# Patient Record
Sex: FEMALE | Race: WHITE | NOT HISPANIC OR LATINO | Employment: FULL TIME | URBAN - NONMETROPOLITAN AREA
[De-identification: names, ages, dates, MRNs, and addresses within clinical notes are randomized per-mention and may not be internally consistent; named-entity substitution may affect disease eponyms.]

---

## 2023-11-06 ENCOUNTER — OCCUPATIONAL MEDICINE (OUTPATIENT)
Dept: URGENT CARE | Facility: CLINIC | Age: 23
End: 2023-11-06
Payer: COMMERCIAL

## 2023-11-06 VITALS
SYSTOLIC BLOOD PRESSURE: 124 MMHG | OXYGEN SATURATION: 97 % | HEIGHT: 68 IN | BODY MASS INDEX: 35.46 KG/M2 | HEART RATE: 118 BPM | RESPIRATION RATE: 18 BRPM | TEMPERATURE: 97.9 F | DIASTOLIC BLOOD PRESSURE: 82 MMHG | WEIGHT: 234 LBS

## 2023-11-06 DIAGNOSIS — R00.0 TACHYCARDIA: ICD-10-CM
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
## 2023-11-06 PROCEDURE — 99204 OFFICE O/P NEW MOD 45 MIN: CPT | Performed by: FAMILY MEDICINE

## 2023-11-06 PROCEDURE — 3079F DIAST BP 80-89 MM HG: CPT | Performed by: FAMILY MEDICINE

## 2023-11-06 PROCEDURE — 3074F SYST BP LT 130 MM HG: CPT | Performed by: FAMILY MEDICINE

## 2023-11-06 RX ORDER — AMOXICILLIN AND CLAVULANATE POTASSIUM 875; 125 MG/1; MG/1
1 TABLET, FILM COATED ORAL 2 TIMES DAILY
Qty: 6 TABLET | Refills: 0 | Status: SHIPPED | OUTPATIENT
Start: 2023-11-06 | End: 2023-11-09

## 2023-11-06 NOTE — LETTER
[facility]  [address] [place] [address],   [address] [place] [address]  Phone:  [phone] - Fax:  [phone]   [facility] Progress Report and Disability Certification  Date of Service: 11/6/2023   No Show:  No  Date / Time of Next Visit: 11/13/2023   Claim Information   Patient Name: Yoli Ralph  Claim Number:     Employer:   [employer] [[email or web address]] Date of Injury: 11/6/2023     Insurer / TPA: [employer]  ID / SSN:     Occupation:  in Training  Diagnosis: Diagnoses of Cat bite, initial encounter and Tachycardia were pertinent to this visit.    Medical Information   Related to Industrial Injury? Yes    Subjective Complaints:  DOI: 11/6/2023  CASSANDRA: Today at work she was administering subcutaneous fluids when it turned around and bit her left index finger. She now has constant pain to her left index finger, it's described as a dull ache, currently rated 4/10. She has not yet had any medication. She is right hand dominant. Cat's rabies vaccination status is unknown. Patient had rabies vaccines November 2021. Patient's last tdap is 5/24/2022. Denies prior left index finger injury. No secondary employment.    Objective Findings: Puncture wound to the base of her left index finger. She is still able to flex and extend finger against resistance. Equal strength and sensation to hands bilaterally.    Pre-Existing Condition(s):     Assessment:   Initial Visit    Status: Additional Care Required  Permanent Disability:No    Plan:      Diagnostics:      Comments:  -Released to full duty  -Prophylactic Augmentin has been sent  -Tylenol and ibuprofen as needed for pain  -Ice for swelling for the first 48 hours    Disability Information   Status: Released to Full Duty    From:  11/6/2023  Through: 11/13/2023 Restrictions are: Temporary   Physical Restrictions   Sitting:    Standing:    Stooping:    Bending:      Squatting:    Walking:    Climbing:     Pushing:      Pulling:    Other:    Reaching Above Shoulder (L):   Reaching Above Shoulder (R):       Reaching Below Shoulder (L):    Reaching Below Shoulder (R):      Not to exceed Weight Limits   Carrying(hrs):   Weight Limit(lb):   Lifting(hrs):   Weight  Limit(lb):     Comments:      Repetitive Actions   Hands: i.e. Fine Manipulations from Grasping:     Feet: i.e. Operating Foot Controls:     Driving / Operate Machinery:     Health Care Provider’s Original or Electronic Signature  Sherron Lawton M.D. Health Care Provider’s Original or Electronic Signature    Costa Strickland DO MPH     Clinic Name / Location: [address],   [address] [place] [address] Clinic Phone Number: Dept: [phone]   Appointment Time: 4:30 Pm Visit Start Time: 5:04 PM   Check-In Time:  4:42 Pm Visit Discharge Time:  537pm   Original-Treating Physician or Chiropractor    Page 2-Insurer/TPA    Page 3-Employer    Page 4-Employee

## 2023-11-06 NOTE — LETTER
"    EMPLOYEE’S CLAIM FOR COMPENSATION/ REPORT OF INITIAL TREATMENT  FORM C-4  PLEASE TYPE OR PRINT    EMPLOYEE’S CLAIM - PROVIDE ALL INFORMATION REQUESTED   First Name                    MAXIMO Kent Last Name  Ramo Birthdate                    2000                Sex  []M  []F Claim Number (Insurer’s Use Only)     Home Address  [address] Age  23 y.o. 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 (Be specific and answer in detail. Use additional sheet if necessary)  cat bite while administering fluids   If you believe that you have an occupational disease, when did you first have knowledge of the disability and its relationship to your employment?   Witnesses to the Accident (if applicable)  Dr. Abdirahman Cox      Nature of Injury or Occupational Disease  Laceration  Part(s) of Body Injured or Affected  Finger (L) N/A N/A    I CERTIFY THAT THE ABOVE IS TRUE AND CORRECT TO T HE " BEST OF MY KNOWLEDGE AND THAT I HAVE PROVIDED THIS INFORMATION IN ORDER TO OBTAIN THE BENEFITS OF [place], OR CHAPTER 617 [place]).  I HEREBY AUTHORIZE ANY PHYSICIAN, CHIROPRACTOR, SURGEON, PRACTITIONER OR ANY OTHER PERSON, ANY HOSPITAL, INCLUDING [facility] OR [facility], ANY  MEDICAL SERVICE ORGANIZATION, ANY INSURANCE COMPANY, OR OTHER INSTITUTION OR ORGANIZATION TO RELEASE TO EACH OTHER, ANY MEDICAL OR OTHER INFORMATION, INCLUDING BENEFITS PAID OR PAYABLE, PERTINENT TO THIS INJURY OR DISEASE, EXCEPT INFORMATION RELATIVE TO DIAGNOSIS, TREATMENT AND/OR COUNSELING FOR AIDS, PSYCHOLOGICAL CONDITIONS, ALCOHOL OR CONTROLLED SUBSTANCES, FOR WHICH I MUST GIVE SPECIFIC AUTHORIZATION.  A PHOTOSTAT OF THIS AUTHORIZATION SHALL BE VALID AS THE ORIGINAL.     Date   Place Employee’s Original or  *Electronic Signature   THIS REPORT MUST BE COMPLETED AND MAILED WITHIN 3 WORKING DAYS OF TREATMENT   Place  [facility]RGENT [facility]    Name of Facility  [facility]   Date 11/6/2023 Diagnosis and Description of Injury or Occupational Disease  (W55.01XA) Cat bite, initial encounter  (R00.0) Tachycardia  Diagnoses of Cat bite, initial encounter and Tachycardia were pertinent to this visit. Is there evidence that the injured employee was under the influence of alcohol and/or another controlled substance at the time of accident?  []No  [] Yes (if yes, please explain)   Hour 5:04 PM  No   Treatment: -Released to full duty  -Prophylactic Augmentin has been sent  -Tylenol and ibuprofen as needed for pain  -Ice for swelling for the first 48 hours    Have you advised the patient to remain off work five days or more?   [] Yes Indicate dates: From   To    []No If no, is the injured employee capable of: [] full duty [] modified duty                                                             Yes     If modified duty, specify any  limitations / restrictions:                                                                                                                                                                                                                                                                                                                                                                                                                  X-Ray Findings:      From information given by the employee, together with medical evidence, can you directly connect this injury or occupational disease as job incurred?  []Yes   [] No Yes    Is additional medical care by a physician indicated? []Yes [] No  Yes    Do you know of any previous injury or disease contributing to this condition or occupational disease? []Yes [] No (Explain if yes)                          No   Date  11/6/2023 Print Health Care Provider’s Name  Kathy Lawton M.D. I certify that the employer’s copy of  this form was delivered to the employer on:   Address  [address],   [address] INSURER'S USE ONLY                       [place]  [address] Provider’s Tax ID Number  [id number]   Telephone  Dept: [phone]    Health Care Provider’s Original or Electronic Signature  e-KATHY Major M.D. Degree (MD,DO, DC,PA-C,APRN)  MD  Choose (if applicable)      ORIGINAL - TREATING HEALTHCARE PROVIDER PAGE 2 - INSURER/TPA PAGE 3 - EMPLOYER PAGE 4 - EMPLOYEE             Form C-4 (rev.08/23)        BRIEF DESCRIPTION OF RIGHTS AND BENEFITS  (Pursuant to [place] 616C.050)    Notice of Injury or Occupational Disease (Incident Report Form C-1): If an injury or occupational disease (OD) arises out of and in the course of employment, you must provide written notice to your employer as soon as practicable, but no later than 7 days after the accident or OD. Your employer shall maintain a sufficient supply of the required forms.    Claim for  "Compensation (Form C-4): If medical treatment is sought, the form C-4 is available at the place of initial treatment. A completed \"Claim for Compensation\" (Form C-4) must be filed within 90 days after an accident or OD. The treating physician or chiropractor must, within 3 working days after treatment, complete and mail to the employer, the employer's insurer and third-party , the Claim for Compensation.    Medical Treatment: If you require medical treatment for your on-the-job injury or OD, you may be required to select a physician or chiropractor from a list provided by your workers’ compensation insurer, if it has contracted with an Organization for Managed Care (MCO) or Preferred Provider Organization (PPO) or providers of health care. If your employer has not entered into a contract with an MCO or PPO, you may select a physician or chiropractor from the Panel of Physicians and Chiropractors. Any medical costs related to your industrial injury or OD will be paid by your insurer.    Temporary Total Disability (TTD): If your doctor has certified that you are unable to work for a period of at least 5 consecutive days, or 5 cumulative days in a 20-day period, or places restrictions on you that your employer does not accommodate, you may be entitled to TTD compensation.    Temporary Partial Disability (TPD): If the wage you receive upon reemployment is less than the compensation for TTD to which you are entitled, the insurer may be required to pay you TPD compensation to make up the difference. TPD can only be paid for a maximum of 24 months.    Permanent Partial Disability (PPD): When your medical condition is stable and there is an indication of a PPD as a result of your injury or OD, within 30 days, your insurer must arrange for an evaluation by a rating physician or chiropractor to determine the degree of your PPD. The amount of your PPD award depends on the date of injury, the results of the PPD " evaluation, your age and wage.    Permanent Total Disability (PTD): If you are medically certified by a treating physician or chiropractor as permanently and totally disabled and have been granted a PTD status by your insurer, you are entitled to receive monthly benefits not to exceed 66 2/3% of your average monthly wage. The amount of your PTD payments is subject to reduction if you previously received a lump-sum PPD award.    Vocational Rehabilitation Services: You may be eligible for vocational rehabilitation services if you are unable to return to the job due to a permanent physical impairment or permanent restrictions as a result of your injury or occupational disease.    Transportation and Per Nhung Reimbursement: You may be eligible for travel expenses and per nhung associated with medical treatment.    Reopening: You may be able to reopen your claim if your condition worsens after claim closure.     Appeal Process: If you disagree with a written determination issued by the insurer or the insurer does not respond to your request, you may appeal to the Department of Administration, , by following the instructions contained in your determination letter. You must appeal the determination within 70 days from the date of the determination letter at [address], [address], [place], [place] [address], or [address], [address], [place] [address]. If you disagree with the  decision, you may appeal to the Department of Administration, . You must file your appeal within 30 days from the date of the  decision letter at [address], [address], [place], [place] [address], or [address], [address], [place] [address]. If you disagree with a decision of an , you may file a petition for judicial review with the District Court. You must do so within 30 days of the Appeal Officer’s decision. You may be  represented by an  at your own expense or you may contact the [facility] for possible representation.    [place]  for Injured Workers (NAIW): If you disagree with a  decision, you may request that NAIW represent you without charge at an  Hearing. For information regarding denial of benefits, you may contact the [facility] at: [address], [address], [place] [address], [phone], or [address], [address], [place] [address], [phone]    To File a Complaint with the Division: If you wish to file a complaint with the  of the Division of Industrial Relations (DIR),  please contact the Workers’ Compensation Section, [address], [address], [place], [place] [address], telephone [phone], or [address], [address], [place], [place] [address], telephone [phone] [phone].    For assistance with Workers’ Compensation Issues: You may contact the [facility] Office for Consumer Health Assistance, [address], [address], [place], [place] [address], Toll Free 1-645.520.6448, Web site: http://[facility] access hospital.[place].gov/Programs/IHSAN E-mail: [email or web address]              __________________________________________________________________                                    _________________            Employee Name / Signature                                                                                                                            Date                                                                                                                                                                                                                              D-2 (rev. 10/20)

## 2023-11-11 ENCOUNTER — OCCUPATIONAL MEDICINE (OUTPATIENT)
Dept: URGENT CARE | Facility: CLINIC | Age: 23
End: 2023-11-11
Payer: COMMERCIAL

## 2023-11-11 VITALS
HEIGHT: 68 IN | DIASTOLIC BLOOD PRESSURE: 72 MMHG | OXYGEN SATURATION: 96 % | BODY MASS INDEX: 35.46 KG/M2 | HEART RATE: 86 BPM | SYSTOLIC BLOOD PRESSURE: 114 MMHG | RESPIRATION RATE: 16 BRPM | TEMPERATURE: 97.5 F | WEIGHT: 234 LBS
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
## 2023-11-11 PROCEDURE — 3074F SYST BP LT 130 MM HG: CPT | Performed by: PHYSICIAN ASSISTANT

## 2023-11-11 PROCEDURE — 3078F DIAST BP <80 MM HG: CPT | Performed by: PHYSICIAN ASSISTANT

## 2023-11-11 PROCEDURE — 99213 OFFICE O/P EST LOW 20 MIN: CPT | Performed by: PHYSICIAN ASSISTANT

## 2023-11-11 ASSESSMENT — ENCOUNTER SYMPTOMS
SENSORY CHANGE: 0
FEVER: 0
WEAKNESS: 0
NAUSEA: 0
TINGLING: 0
FOCAL WEAKNESS: 0
CHILLS: 0
VOMITING: 0

## 2023-11-11 NOTE — PROGRESS NOTES
"Subjective     Yoli Ralph is a 23 y.o. female who presents with Follow-Up (WC DOI 11/6/23, cat bite on L hand index finger, swelling and redness has gone down )      DOI:11/6/23. Patient here for follow-up on her left index finger cat bite. She finished the 3 days of prophylactic Augmentin. She reports improvement in redness and swelling around the bite. No purulent drainage. No fever or chills. Still some mild pain and tightness with flexion.      HPI      History reviewed. No pertinent past medical history.      History reviewed. No pertinent surgical history.      History reviewed. No pertinent family history.      Allergies:  Lidocaine      Medications, Allergies, and current problem list reviewed today in Epic    Review of Systems   Constitutional:  Negative for chills, fever and malaise/fatigue.   Gastrointestinal:  Negative for nausea and vomiting.   Musculoskeletal:  Negative for joint pain.   Skin:         Cat bite to left index finger   Neurological:  Negative for tingling, sensory change, focal weakness and weakness.              Objective     /72 (BP Location: Left arm, Patient Position: Sitting, BP Cuff Size: Adult)   Pulse 86   Temp 36.4 °C (97.5 °F) (Temporal)   Resp 16   Ht 1.727 m (5' 8\")   Wt 106 kg (234 lb)   SpO2 96%   BMI 35.58 kg/m²      Physical Exam  Constitutional:       General: She is not in acute distress.     Appearance: She is not ill-appearing.   HENT:      Head: Normocephalic and atraumatic.   Eyes:      Conjunctiva/sclera: Conjunctivae normal.   Cardiovascular:      Rate and Rhythm: Normal rate and regular rhythm.   Pulmonary:      Effort: Pulmonary effort is normal. No respiratory distress.      Breath sounds: No stridor. No wheezing.   Neurological:      General: No focal deficit present.      Mental Status: She is alert and oriented to person, place, and time.   Psychiatric:         Mood and Affect: Mood normal.         Behavior: Behavior normal.         Thought " Content: Thought content normal.         Judgment: Judgment normal.         Vitals reviewed.  Left index finger- two small puncture wounds on palmar aspect. No active drainage. Mild surrounding edema and mild erythema. FROM                   Assessment & Plan        1. Cat bite, subsequent encounter    Improving but not 100%  Continue wound care  RTC 4 days.   Full duty    Differential diagnoses, Supportive care, and indications for immediate follow-up discussed with patient.   Pathogenesis of diagnosis discussed including typical length and natural progression.   Instructed to return to clinic or nearest emergency department for any change in condition, further concerns, or worsening of symptoms.      The patient demonstrated a good understanding and agreed with the treatment plan.      Roxie Potter P.A.-C.

## 2023-11-11 NOTE — LETTER
[address],   [address] [place] [address]  Phone:  [phone] - Fax:  [phone]   [facility] Progress Report and Disability Certification  Date of Service: 11/11/2023   No Show:  No  Date / Time of Next Visit: 11/14/2023   Claim Information   Patient Name: Yoli Ralph  Claim Number:     Employer:   [employer] Date of Injury: 11/6/2023     Insurer / TPA: [employer]  ID / SSN:     Occupation:  in Training  Diagnosis: The encounter diagnosis was Cat bite, subsequent encounter.    Medical Information   Related to Industrial Injury? Yes    Subjective Complaints:  DOI:11/6/23. Patient here for follow-up on her left index finger cat bite. She finished the 3 days of prophylactic Augmentin. She reports improvement in redness and swelling around the bite. No purulent drainage. No fever or chills. Still some mild pain and tightness with flexion.    Objective Findings: Vitals reviewed.  Left index finger- two small puncture wounds on palmar aspect. No active drainage. Mild surrounding edema and mild erythema. FROM   Pre-Existing Condition(s):     Assessment:   Condition Improved    Status: Additional Care Required  Permanent Disability:No    Plan:   Comments:keep wound clean covered and dry. RTC 4 days.    Diagnostics:      Comments:       Disability Information   Status: Released to Full Duty    From:  11/11/2023  Through: 11/14/2023 Restrictions are:     Physical Restrictions   Sitting:    Standing:    Stooping:    Bending:      Squatting:    Walking:    Climbing:    Pushing:      Pulling:    Other:    Reaching Above Shoulder (L):   Reaching Above Shoulder (R):       Reaching Below Shoulder (L):    Reaching Below Shoulder (R):      Not to exceed Weight Limits   Carrying(hrs):   Weight Limit(lb):   Lifting(hrs):   Weight  Limit(lb):     Comments:      Repetitive Actions   Hands: i.e. Fine Manipulations  from Grasping:     Feet: i.e. Operating Foot Controls:     Driving / Operate Machinery:     Health Care Provider’s Original or Electronic Signature  Roxie Potter P.A.-C. Health Care Provider’s Original or Electronic Signature    Costa Strickland DO MPH     Clinic Name / Location: [address],   [address]  [place] [address] Clinic Phone Number: Dept: [phone]   Appointment Time: 10:00 Am Visit Start Time: 10:07 AM   Check-In Time:  10:01 Am Visit Discharge Time:  10:18 AM   Original-Treating Physician or Chiropractor    Page 2-Insurer/TPA    Page 3-Employer    Page 4-Employee

## 2023-11-14 ENCOUNTER — OCCUPATIONAL MEDICINE (OUTPATIENT)
Dept: URGENT CARE | Facility: CLINIC | Age: 23
End: 2023-11-14
Payer: COMMERCIAL

## 2023-11-14 VITALS
OXYGEN SATURATION: 97 % | BODY MASS INDEX: 35.52 KG/M2 | TEMPERATURE: 97.9 F | DIASTOLIC BLOOD PRESSURE: 76 MMHG | HEART RATE: 81 BPM | HEIGHT: 68 IN | SYSTOLIC BLOOD PRESSURE: 122 MMHG | RESPIRATION RATE: 18 BRPM | WEIGHT: 234.4 LBS
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
## 2023-11-14 PROCEDURE — 3078F DIAST BP <80 MM HG: CPT | Performed by: NURSE PRACTITIONER

## 2023-11-14 PROCEDURE — 99212 OFFICE O/P EST SF 10 MIN: CPT | Performed by: NURSE PRACTITIONER

## 2023-11-14 PROCEDURE — 3074F SYST BP LT 130 MM HG: CPT | Performed by: NURSE PRACTITIONER

## 2023-11-14 NOTE — LETTER
[address],   [address] [place] [address]  Phone:  [phone] - Fax:  [phone]   [facility] Progress Report and Disability Certification  Date of Service: 11/14/2023   No Show:  No  Date / Time of Next Visit:     Claim Information   Patient Name: Yoli Ralph  Claim Number:     Employer:   [employer] Date of Injury: 11/6/2023     Insurer / TPA: [employer]  ID / SSN:     Occupation:  in Training  Diagnosis: The encounter diagnosis was Cat bite, subsequent encounter.    Medical Information   Related to Industrial Injury? Yes    Subjective Complaints:  Patient presents to clinic for third Workmen's Comp. follow-up.  Patient states that she has no continued redness swelling or pain to the area.  She states she has full range of motion.  She has no concerns at this time and is ready to close the Workmen's Comp. claim.   Objective Findings: Mild scarring to the lateral aspect of the right index finger.  There is no surrounding erythema swelling or tenderness.  Range of motion is intact.  Distal neurovascular status is intact.   Pre-Existing Condition(s):     Assessment:   Condition Improved    Status: Discharged /  MMI  Permanent Disability:No    Plan:      Diagnostics:      Comments:       Disability Information   Status: Released to Full Duty    From:     Through:   Restrictions are: Permanent   Physical Restrictions   Sitting:    Standing:    Stooping:    Bending:      Squatting:    Walking:    Climbing:    Pushing:      Pulling:    Other:    Reaching Above Shoulder (L):   Reaching Above Shoulder (R):       Reaching Below Shoulder (L):    Reaching Below Shoulder (R):      Not to exceed Weight Limits   Carrying(hrs):   Weight Limit(lb):   Lifting(hrs):   Weight  Limit(lb):     Comments: Discharge MMI    Repetitive Actions   Hands: i.e. Fine Manipulations from Grasping:     Feet: i.e. Operating Foot  Controls:     Driving / Operate Machinery:     Health Care Provider’s Original or Electronic Signature  [place] Mooney Health Care Provider’s Original or Electronic Signature    Costa Strickland DO MPH     Clinic Name / Location: [address],   [address]  [place] [address] Clinic Phone Number: Dept: [phone]   Appointment Time: 6:00 Pm Visit Start Time: 6:06 PM   Check-In Time:  5:58 Pm Visit Discharge Time:  6:16 PM   Original-Treating Physician or Chiropractor    Page 2-Insurer/TPA    Page 3-Employer    Page 4-Employee

## 2023-11-15 NOTE — PROGRESS NOTES
"Subjective:     Yoli Ralph is a 23 y.o. female who presents for Other (W/C follow up)      Patient presents to clinic for third Workmen's Comp. follow-up.  Patient states that she has no continued redness swelling or pain to the area.  She states she has full range of motion.  She has no concerns at this time and is ready to close the Workmen's Comp. claim.    PMH:   No pertinent past medical history to this problem  MEDS:  Medications were reviewed in EMR  ALLERGIES:  Allergies were reviewed in EMR  FH:   No pertinent family history to this problem       Objective:     /76   Pulse 81   Temp 36.6 °C (97.9 °F) (Temporal)   Resp 18   Ht 1.727 m (5' 8\")   Wt 106 kg (234 lb 6.4 oz)   SpO2 97%   BMI 35.64 kg/m²     Mild scarring to the lateral aspect of the right index finger.  There is no surrounding erythema swelling or tenderness.  Range of motion is intact.  Distal neurovascular status is intact.    Assessment/Plan:     Reviewed previous 2 visits.  Patient was treated with 3 days of prophylactic Augmentin.  She was seen 4 days ago they do not close Workmen's Comp. claim at that time but she still had some mild redness and swelling.  She presents to clinic today with no signs of secondary infection with healed cat bite.  Patient is discharged at this time.  1. Cat bite, subsequent encounter    Released to Full Duty FROM   TO    Discharge MMI       Differential diagnosis, natural history, supportive care, and indications for immediate follow-up discussed.    "

## 2025-01-24 NOTE — PROGRESS NOTES
"  Subjective:     23 y.o. female presents for Other (W/C cat bite  left index finger)      DOI: 11/6/2023  CASSANDRA: Today at work she was administering subcutaneous fluids when it turned around and bit her left index finger. She now has constant pain to her left index finger, it's described as a dull ache, currently rated 4/10. She has not yet had any medication. She is right hand dominant. Cat's rabies vaccination status is unknown. Patient had rabies vaccines November 2021. Patient's last tdap is 5/24/2022. Denies prior left index finger injury. No secondary employment.     PMH:   No pertinent past medical history to this problem  MEDS:  Medications were reviewed in EMR  ALLERGIES:  Allergies were reviewed in EMR  FH:   No pertinent family history to this problem     Objective:     /82   Pulse (!) 118   Temp 36.6 °C (97.9 °F) (Temporal)   Resp 18   Ht 1.727 m (5' 8\")   Wt 106 kg (234 lb)   SpO2 97%   BMI 35.58 kg/m²     Puncture wound to the base of her left index finger. She is still able to flex and extend finger against resistance. Equal strength and sensation to hands bilaterally.     Assessment/Plan:     1. Cat bite, initial encounter  - amoxicillin-clavulanate (AUGMENTIN) 875-125 MG Tab; Take 1 Tablet by mouth 2 times a day for 3 days.  Dispense: 6 Tablet; Refill: 0    2. Tachycardia  - amoxicillin-clavulanate (AUGMENTIN) 875-125 MG Tab; Take 1 Tablet by mouth 2 times a day for 3 days.  Dispense: 6 Tablet; Refill: 0    Released to Full Duty FROM 11/6/2023 TO 11/13/2023     -Released to full duty  -Prophylactic Augmentin has been sent  -Tylenol and ibuprofen as needed for pain  -Ice for swelling for the first 48 hours    Differential diagnosis, natural history, supportive care, and indications for immediate follow-up discussed.  "
[[id number]]